# Patient Record
Sex: FEMALE | Race: WHITE
[De-identification: names, ages, dates, MRNs, and addresses within clinical notes are randomized per-mention and may not be internally consistent; named-entity substitution may affect disease eponyms.]

---

## 2017-06-19 ENCOUNTER — APPOINTMENT (OUTPATIENT)
Dept: OBGYN | Facility: CLINIC | Age: 57
End: 2017-06-19

## 2017-06-19 VITALS
DIASTOLIC BLOOD PRESSURE: 83 MMHG | WEIGHT: 143 LBS | SYSTOLIC BLOOD PRESSURE: 124 MMHG | HEIGHT: 64 IN | BODY MASS INDEX: 24.41 KG/M2

## 2017-06-26 LAB — CYTOLOGY CVX/VAG DOC THIN PREP: NORMAL

## 2018-01-08 ENCOUNTER — LABORATORY RESULT (OUTPATIENT)
Age: 58
End: 2018-01-08

## 2018-01-08 ENCOUNTER — APPOINTMENT (OUTPATIENT)
Dept: OBGYN | Facility: CLINIC | Age: 58
End: 2018-01-08
Payer: COMMERCIAL

## 2018-01-08 VITALS
BODY MASS INDEX: 24.92 KG/M2 | SYSTOLIC BLOOD PRESSURE: 118 MMHG | WEIGHT: 146 LBS | DIASTOLIC BLOOD PRESSURE: 76 MMHG | HEIGHT: 64 IN

## 2018-01-08 DIAGNOSIS — N95.0 POSTMENOPAUSAL BLEEDING: ICD-10-CM

## 2018-01-08 PROCEDURE — 58100 BIOPSY OF UTERUS LINING: CPT

## 2018-02-26 ENCOUNTER — ASOB RESULT (OUTPATIENT)
Age: 58
End: 2018-02-26

## 2018-02-26 ENCOUNTER — APPOINTMENT (OUTPATIENT)
Dept: OBGYN | Facility: CLINIC | Age: 58
End: 2018-02-26
Payer: COMMERCIAL

## 2018-02-26 PROCEDURE — 76831 ECHO EXAM UTERUS: CPT

## 2019-04-01 ENCOUNTER — APPOINTMENT (OUTPATIENT)
Dept: OBGYN | Facility: CLINIC | Age: 59
End: 2019-04-01
Payer: COMMERCIAL

## 2019-04-01 VITALS
BODY MASS INDEX: 24.92 KG/M2 | DIASTOLIC BLOOD PRESSURE: 86 MMHG | HEIGHT: 64 IN | SYSTOLIC BLOOD PRESSURE: 129 MMHG | WEIGHT: 146 LBS

## 2019-04-01 DIAGNOSIS — Z01.419 ENCOUNTER FOR GYNECOLOGICAL EXAMINATION (GENERAL) (ROUTINE) W/OUT ABNORMAL FINDINGS: ICD-10-CM

## 2019-04-01 PROCEDURE — 58301 REMOVE INTRAUTERINE DEVICE: CPT

## 2019-04-01 PROCEDURE — 99396 PREV VISIT EST AGE 40-64: CPT | Mod: 25

## 2019-04-01 NOTE — PROCEDURE
[Paraguard] : Misael [Patient] : patient [Strings Visualized] : the IUD strings were visualized [IUD Discarded] : discarded [Tolerated Well] : the patient tolerated the procedure well

## 2019-04-01 NOTE — PHYSICAL EXAM
[Alert] : alert [Labia Majora] : labia major [Labia Minora] : labia minora [Normal] : clitoris [External Hemorrhoid] : no external hemorrhoids [FreeTextEntry4] : pale  fair tone [FreeTextEntry5] : non tender pap done [FreeTextEntry6] : nl bimanual exam [FreeTextEntry7] : non tender no uterine or adnexal masses

## 2019-04-08 LAB — CYTOLOGY CVX/VAG DOC THIN PREP: NORMAL

## 2020-04-20 ENCOUNTER — APPOINTMENT (OUTPATIENT)
Dept: OBGYN | Facility: CLINIC | Age: 60
End: 2020-04-20

## 2020-06-22 ENCOUNTER — APPOINTMENT (OUTPATIENT)
Dept: OBGYN | Facility: CLINIC | Age: 60
End: 2020-06-22
Payer: COMMERCIAL

## 2020-06-22 VITALS
HEIGHT: 64 IN | WEIGHT: 153 LBS | SYSTOLIC BLOOD PRESSURE: 126 MMHG | DIASTOLIC BLOOD PRESSURE: 84 MMHG | BODY MASS INDEX: 26.12 KG/M2

## 2020-06-22 DIAGNOSIS — Z86.39 PERSONAL HISTORY OF OTHER ENDOCRINE, NUTRITIONAL AND METABOLIC DISEASE: ICD-10-CM

## 2020-06-22 PROCEDURE — 99396 PREV VISIT EST AGE 40-64: CPT

## 2020-06-22 NOTE — PHYSICAL EXAM
[Normal] : vagina [Labia Majora] : labia major [Labia Minora] : labia minora [FreeTextEntry5] : non tender PAP done [External Hemorrhoid] : no external hemorrhoids [FreeTextEntry6] : nl bimanual [FreeTextEntry7] : no uterine or adnexal masses

## 2020-06-25 LAB — CYTOLOGY CVX/VAG DOC THIN PREP: ABNORMAL

## 2021-06-28 ENCOUNTER — APPOINTMENT (OUTPATIENT)
Dept: OBGYN | Facility: CLINIC | Age: 61
End: 2021-06-28
Payer: COMMERCIAL

## 2021-06-28 VITALS
HEIGHT: 64 IN | SYSTOLIC BLOOD PRESSURE: 113 MMHG | BODY MASS INDEX: 25.1 KG/M2 | DIASTOLIC BLOOD PRESSURE: 76 MMHG | WEIGHT: 147 LBS

## 2021-06-28 DIAGNOSIS — Z01.419 ENCOUNTER FOR GYNECOLOGICAL EXAMINATION (GENERAL) (ROUTINE) W/OUT ABNORMAL FINDINGS: ICD-10-CM

## 2021-06-28 PROCEDURE — 99072 ADDL SUPL MATRL&STAF TM PHE: CPT

## 2021-06-28 PROCEDURE — 99396 PREV VISIT EST AGE 40-64: CPT

## 2021-06-28 NOTE — PHYSICAL EXAM
[Appropriately responsive] : appropriately responsive [Alert] : alert [No Acute Distress] : no acute distress [No Lymphadenopathy] : no lymphadenopathy [Regular Rate Rhythm] : regular rate rhythm [No Murmurs] : no murmurs [Clear to Auscultation B/L] : clear to auscultation bilaterally [Soft] : soft [Non-tender] : non-tender [Oriented x3] : oriented x3 [FreeTextEntry3] : no thyromegaly [FreeTextEntry7] : no organomgaly [Examination Of The Breasts] : a normal appearance [No Masses] : no breast masses were palpable [Labia Majora] : normal [Labia Minora] : normal [Normal] : normal [FreeTextEntry4] : nl vagina no discharge [FreeTextEntry5] : non tender PAP done [FreeTextEntry6] : no uterine or adnexal masses [FreeTextEntry8] : nl bimanual

## 2021-06-28 NOTE — DISCUSSION/SUMMARY
[FreeTextEntry1] : 60 yr old for annual exam does Yoga 4 times a week in [facility] and retired from social work.  She sees a breast surgeon and has testing  twice a year as mother had breast cancer. She has cardiac work up and has leaky valves but nothing to be done.

## 2021-07-01 LAB — CYTOLOGY CVX/VAG DOC THIN PREP: ABNORMAL

## 2022-07-26 ENCOUNTER — APPOINTMENT (OUTPATIENT)
Dept: OTOLARYNGOLOGY | Facility: CLINIC | Age: 62
End: 2022-07-26

## 2022-07-26 VITALS
SYSTOLIC BLOOD PRESSURE: 114 MMHG | HEART RATE: 65 BPM | HEIGHT: 64 IN | DIASTOLIC BLOOD PRESSURE: 74 MMHG | WEIGHT: 148 LBS | BODY MASS INDEX: 25.27 KG/M2

## 2022-07-26 DIAGNOSIS — Z86.16 PERSONAL HISTORY OF COVID-19: ICD-10-CM

## 2022-07-26 DIAGNOSIS — Z86.39 PERSONAL HISTORY OF OTHER ENDOCRINE, NUTRITIONAL AND METABOLIC DISEASE: ICD-10-CM

## 2022-07-26 PROCEDURE — 31231 NASAL ENDOSCOPY DX: CPT

## 2022-07-26 PROCEDURE — 92567 TYMPANOMETRY: CPT

## 2022-07-26 PROCEDURE — 92557 COMPREHENSIVE HEARING TEST: CPT

## 2022-07-26 PROCEDURE — 99243 OFF/OP CNSLTJ NEW/EST LOW 30: CPT | Mod: 25

## 2022-07-26 RX ORDER — RALOXIFENE HYDROCHLORIDE 60 MG/1
60 TABLET, FILM COATED ORAL
Qty: 90 | Refills: 0 | Status: ACTIVE | COMMUNITY
Start: 2022-03-25

## 2022-07-26 RX ORDER — TACROLIMUS 1 MG/G
0.1 OINTMENT TOPICAL
Qty: 100 | Refills: 0 | Status: ACTIVE | COMMUNITY
Start: 2022-06-07

## 2022-07-26 RX ORDER — CETIRIZINE HCL 10 MG
TABLET ORAL
Refills: 0 | Status: ACTIVE | COMMUNITY

## 2022-07-26 RX ORDER — SODIUM SULFACETAMIDE 100 MG/G
10 LIQUID TOPICAL
Qty: 177 | Refills: 0 | Status: ACTIVE | COMMUNITY
Start: 2021-06-24

## 2022-07-26 NOTE — REVIEW OF SYSTEMS
[Sneezing] : sneezing [Seasonal Allergies] : seasonal allergies [Sense Of Smell Problem] : sense of smell problem [FreeTextEntry3] : watery itchy eyes [de-identified] : feels cooler than others. sweating at night [de-identified] : fatigue.

## 2022-07-26 NOTE — REASON FOR VISIT
[Initial Consultation] : an initial consultation for [FreeTextEntry2] : had covid over 6 months ago and still no sense of smell

## 2022-07-26 NOTE — DATA REVIEWED
[de-identified] : Type A tymps AU\par mild SNHL, 250-8000 Hz, AU\par REC: ENT f/u, consider AU hearing aids; discussed with patient

## 2022-07-26 NOTE — ADDENDUM
[FreeTextEntry1] : Documented by Rodo Roberson acting as scribe for Dr. Solomon on 07/26/2022.\par \par All Medical record entries made by the Scribe were at my, Dr. Solomon, direction and personally dictated by me on 07/26/2022. I have reviewed the chart and agree that the record accurately reflects my personal performance of the history, physical exam, assessment and plan. I have also personally directed, reviewed, and agreed with the discharge instructions.

## 2022-07-26 NOTE — PROCEDURE
[Recalcitrant Symptoms] : recalcitrant symptoms  [Anterior rhinoscopy insufficient to account for symptoms] : anterior rhinoscopy insufficient to account for symptoms [None] : none [Flexible Endoscope] : examined with the flexible endoscope [FreeTextEntry6] : Procedure: Flexible Nasal Endoscopy: Risks, benefits, and alternatives of flexible endoscopy were explained to the patient. The patient gave oral consent to proceed. The flexible scope was inserted into the right nasal cavity. Deviation of septum on right, middle turb hypertrophy. Endoscopy of the inferior and middle meatus was performed. No polyp, mass, or lesion was appreciated. Olfactory cleft was clear. Spheno-ethmoid recess is clear. Nasopharynx was clear. Turbinates were without mass. The procedure was repeated on the contralateral side finding no polyps or growths, clear back to the middle meatus.

## 2022-07-26 NOTE — CONSULT LETTER
[Dear  ___] : Dear  [unfilled], [Consult Letter:] : I had the pleasure of evaluating your patient, [unfilled]. [Please see my note below.] : Please see my note below. [Consult Closing:] : Thank you very much for allowing me to participate in the care of this patient.  If you have any questions, please do not hesitate to contact me. [Sincerely,] : Sincerely, [FreeTextEntry3] : iGo Solomon MD FACS

## 2022-07-26 NOTE — HISTORY OF PRESENT ILLNESS
[de-identified] : Pt presents today to discuss her anosmia, ageusia and dysgeusia after having COVID in 12/2021. Pt did essential oil training at the initial onset of symptoms. Pt able to taste sweet, salty, and chocolatey. Notes that she cannot smell the cat litter and that lavender now smells awful. Pt reports that she gets intermittent garlic taste in her mouth. Pt also notes chronic rhinitis and some decreased hearing. Brother with h/o hearing loss but is a musician. Denies tinnitus.

## 2022-07-26 NOTE — ASSESSMENT
[FreeTextEntry1] : Reviewed and reconciled medications, allergies, PMHx, PSHx, SocHx, FMHx. \par \par anosmia \par ageusia \par dysgeusia\par decreased hearing\par chronic rhinitis\par \par Audio: Type A tymps AU\par mild SNHL, 250-8000 Hz, AU\par right 92% discrim at 55db, left 96% discrim at 60db\par \par Plan:\par Flexible Nasal Endoscopy. Smell and test info and exercise sheet provided. Mometasone- 2 sprays bilaterally, twice a day, spray laterally. Audio - results interpreted by Dr. Solomon and reviewed with the patient. Hearing amplification per pt's assessment of her hearing. FU 1 year.

## 2022-07-26 NOTE — PHYSICAL EXAM
[Hearing Varela Test (Tuning Fork On Forehead)] : no lateralization of tone [] : septum deviated to the right [Midline] : trachea located in midline position [Normal] : no masses and lesions seen, face is symmetric [de-identified] : small amount of excess mucus, mostly foamy saliva [FreeTextEntry2] : Sinuses nontender to percussion. Sensations intact.

## 2022-08-09 ENCOUNTER — TRANSCRIPTION ENCOUNTER (OUTPATIENT)
Age: 62
End: 2022-08-09

## 2023-07-11 ENCOUNTER — APPOINTMENT (OUTPATIENT)
Dept: OTOLARYNGOLOGY | Facility: CLINIC | Age: 63
End: 2023-07-11
Payer: COMMERCIAL

## 2023-07-11 VITALS
HEART RATE: 66 BPM | SYSTOLIC BLOOD PRESSURE: 114 MMHG | HEIGHT: 64 IN | DIASTOLIC BLOOD PRESSURE: 81 MMHG | WEIGHT: 150 LBS | BODY MASS INDEX: 25.61 KG/M2

## 2023-07-11 DIAGNOSIS — J31.0 CHRONIC RHINITIS: ICD-10-CM

## 2023-07-11 DIAGNOSIS — K11.20 SIALOADENITIS, UNSPECIFIED: ICD-10-CM

## 2023-07-11 DIAGNOSIS — J34.2 DEVIATED NASAL SEPTUM: ICD-10-CM

## 2023-07-11 DIAGNOSIS — R43.0 ANOSMIA: ICD-10-CM

## 2023-07-11 DIAGNOSIS — H90.5 UNSPECIFIED SENSORINEURAL HEARING LOSS: ICD-10-CM

## 2023-07-11 PROCEDURE — 92557 COMPREHENSIVE HEARING TEST: CPT

## 2023-07-11 PROCEDURE — 92567 TYMPANOMETRY: CPT

## 2023-07-11 PROCEDURE — 99213 OFFICE O/P EST LOW 20 MIN: CPT

## 2023-07-11 RX ORDER — LEVOTHYROXINE SODIUM 0.05 MG/1
50 TABLET ORAL
Qty: 90 | Refills: 0 | Status: COMPLETED | COMMUNITY
Start: 2022-05-09 | End: 2023-07-11

## 2023-07-11 NOTE — PHYSICAL EXAM
[Hearing Varela Test (Tuning Fork On Forehead)] : no lateralization of tone [] : septum deviated to the right [Midline] : trachea located in midline position [Normal] : no rashes [de-identified] : submandibular glands are firm, slightly enlarged but symmetric  [de-identified] : mildly inflamed turbinates  [de-identified] : minimal tonsil tissue [de-identified] : mild swelling floor of mouth, to palpation it feels like there is some grit  [de-identified] : MG

## 2023-07-11 NOTE — HISTORY OF PRESENT ILLNESS
[de-identified] : Pt. with h/o anosmia, ageusia, and dysgeusia after having COVID in 12/2021 states that she gets phantom smells and will smell things that no one else smells. She states sometimes her smell is bad and sometimes it is okay. She states she still can't smell cat liter and lavender still smells bad. patient states her nose is very itchy today. Patient denies glands swelling up when eating or drinking something sour

## 2023-07-11 NOTE — DATA REVIEWED
[de-identified] : Type A tymps AU\par Mild SNHL, 250-8000 Hz, AU No change since 7/26/2022\par REC: ENT f/u, re-eval per MD, consider AU HAs; recommended in 2022

## 2023-07-11 NOTE — ASSESSMENT
[FreeTextEntry1] : Reviewed and reconciled medications, allergies, PMHx, PSHx, SocHx, FMHx \par \par Pt. with h/o anosmia, ageusia, and dysgeusia after having COVID in 12/2021 states that she gets phantom smells and will smell things that no one else smells. She states sometimes her smell is bad and sometimes it is okay. She states she still can't smell cat liter and lavender still smells bad. patient states her nose is very itchy today. Patient denies glands swelling up when eating or drinking something sour\par \par Physical Exam:\par -minimal tonsil tissue\par -deviated septum right\par -mildly inflamed turbinates\par -mild swelling floor of mouth, to palpation it feels like there is some grit \par -submandibular glands are firm, slightly enlarged but symmetric \par \par Audio:\par Type A tymps AU\par -96% discrim at 60 dB bilaterally\par Mild SNHL, 250-8000 Hz, AU No change since 7/26/2022\par \par Plan: Audio - results interpreted by Dr. Solomon and reviewed with the patient. FU 1 year

## 2023-07-17 ENCOUNTER — RX RENEWAL (OUTPATIENT)
Age: 63
End: 2023-07-17

## 2023-10-03 ENCOUNTER — NON-APPOINTMENT (OUTPATIENT)
Age: 63
End: 2023-10-03

## 2024-05-08 ENCOUNTER — RX RENEWAL (OUTPATIENT)
Age: 64
End: 2024-05-08

## 2024-05-08 RX ORDER — MOMETASONE 50 UG/1
50 SPRAY, METERED NASAL TWICE DAILY
Qty: 1 | Refills: 3 | Status: ACTIVE | COMMUNITY
Start: 2022-07-26 | End: 1900-01-01

## 2024-07-16 ENCOUNTER — APPOINTMENT (OUTPATIENT)
Dept: OTOLARYNGOLOGY | Facility: CLINIC | Age: 64
End: 2024-07-16
Payer: COMMERCIAL

## 2024-07-16 VITALS
HEART RATE: 81 BPM | SYSTOLIC BLOOD PRESSURE: 103 MMHG | BODY MASS INDEX: 24.41 KG/M2 | DIASTOLIC BLOOD PRESSURE: 72 MMHG | WEIGHT: 143 LBS | HEIGHT: 64 IN

## 2024-07-16 DIAGNOSIS — J34.89 OTHER SPECIFIED DISORDERS OF NOSE AND NASAL SINUSES: ICD-10-CM

## 2024-07-16 DIAGNOSIS — H90.5 UNSPECIFIED SENSORINEURAL HEARING LOSS: ICD-10-CM

## 2024-07-16 DIAGNOSIS — J34.2 DEVIATED NASAL SEPTUM: ICD-10-CM

## 2024-07-16 DIAGNOSIS — U09.9 POST COVID-19 CONDITION, UNSPECIFIED: ICD-10-CM

## 2024-07-16 DIAGNOSIS — R43.0 ANOSMIA: ICD-10-CM

## 2024-07-16 DIAGNOSIS — J31.0 CHRONIC RHINITIS: ICD-10-CM

## 2024-07-16 PROCEDURE — 99214 OFFICE O/P EST MOD 30 MIN: CPT | Mod: 25

## 2024-07-16 PROCEDURE — 31231 NASAL ENDOSCOPY DX: CPT

## 2024-07-16 RX ORDER — IPRATROPIUM BROMIDE 42 UG/1
0.06 SPRAY NASAL
Qty: 1 | Refills: 5 | Status: ACTIVE | COMMUNITY
Start: 2024-07-16 | End: 1900-01-01

## 2024-07-16 RX ORDER — RUXOLITINIB 15 MG/G
CREAM TOPICAL
Refills: 0 | Status: ACTIVE | COMMUNITY

## 2024-07-16 RX ORDER — LEVOTHYROXINE SODIUM 0.07 MG/1
75 TABLET ORAL
Refills: 0 | Status: ACTIVE | COMMUNITY

## 2024-09-16 RX ORDER — FLUTICASONE PROPIONATE 50 UG/1
50 SPRAY, METERED NASAL
Qty: 1 | Refills: 3 | Status: ACTIVE | COMMUNITY
Start: 2024-09-16 | End: 1900-01-01

## 2024-10-29 ENCOUNTER — APPOINTMENT (OUTPATIENT)
Dept: ORTHOPEDIC SURGERY | Facility: CLINIC | Age: 64
End: 2024-10-29
Payer: COMMERCIAL

## 2024-10-29 VITALS — HEIGHT: 64 IN | WEIGHT: 143 LBS | BODY MASS INDEX: 24.41 KG/M2

## 2024-10-29 DIAGNOSIS — S60.221A CONTUSION OF RIGHT HAND, INITIAL ENCOUNTER: ICD-10-CM

## 2024-10-29 DIAGNOSIS — M18.9 OSTEOARTHRITIS OF FIRST CARPOMETACARPAL JOINT, UNSPECIFIED: ICD-10-CM

## 2024-10-29 PROCEDURE — 99204 OFFICE O/P NEW MOD 45 MIN: CPT

## 2024-10-30 RX ORDER — MELOXICAM 15 MG/1
15 TABLET ORAL
Qty: 30 | Refills: 0 | Status: ACTIVE | COMMUNITY
Start: 2024-10-30 | End: 1900-01-01

## 2024-11-12 ENCOUNTER — APPOINTMENT (OUTPATIENT)
Dept: ORTHOPEDIC SURGERY | Facility: CLINIC | Age: 64
End: 2024-11-12
Payer: COMMERCIAL

## 2024-11-12 DIAGNOSIS — S60.221A CONTUSION OF RIGHT HAND, INITIAL ENCOUNTER: ICD-10-CM

## 2024-11-12 DIAGNOSIS — M18.9 OSTEOARTHRITIS OF FIRST CARPOMETACARPAL JOINT, UNSPECIFIED: ICD-10-CM

## 2024-11-12 PROCEDURE — 99213 OFFICE O/P EST LOW 20 MIN: CPT

## 2024-11-19 ENCOUNTER — TRANSCRIPTION ENCOUNTER (OUTPATIENT)
Age: 64
End: 2024-11-19

## 2024-12-17 ENCOUNTER — TRANSCRIPTION ENCOUNTER (OUTPATIENT)
Age: 64
End: 2024-12-17

## 2024-12-31 ENCOUNTER — APPOINTMENT (OUTPATIENT)
Dept: ORTHOPEDIC SURGERY | Facility: CLINIC | Age: 64
End: 2024-12-31
Payer: COMMERCIAL

## 2024-12-31 DIAGNOSIS — S60.221A CONTUSION OF RIGHT HAND, INITIAL ENCOUNTER: ICD-10-CM

## 2024-12-31 DIAGNOSIS — M18.9 OSTEOARTHRITIS OF FIRST CARPOMETACARPAL JOINT, UNSPECIFIED: ICD-10-CM

## 2024-12-31 PROCEDURE — 99213 OFFICE O/P EST LOW 20 MIN: CPT

## 2025-07-15 ENCOUNTER — APPOINTMENT (OUTPATIENT)
Dept: OTOLARYNGOLOGY | Facility: CLINIC | Age: 65
End: 2025-07-15
Payer: COMMERCIAL

## 2025-07-15 VITALS
DIASTOLIC BLOOD PRESSURE: 69 MMHG | HEIGHT: 64 IN | WEIGHT: 148 LBS | SYSTOLIC BLOOD PRESSURE: 107 MMHG | BODY MASS INDEX: 25.27 KG/M2 | HEART RATE: 63 BPM

## 2025-07-15 PROBLEM — E04.1 THYROID NODULE: Status: ACTIVE | Noted: 2025-07-15

## 2025-07-15 PROCEDURE — 92557 COMPREHENSIVE HEARING TEST: CPT

## 2025-07-15 PROCEDURE — 92567 TYMPANOMETRY: CPT

## 2025-07-15 PROCEDURE — 99213 OFFICE O/P EST LOW 20 MIN: CPT

## 2025-08-15 ENCOUNTER — APPOINTMENT (OUTPATIENT)
Dept: PHARMACY | Facility: CLINIC | Age: 65
End: 2025-08-15
Payer: SELF-PAY

## 2025-08-15 PROCEDURE — V5010 ASSESSMENT FOR HEARING AID: CPT | Mod: NC
